# Patient Record
Sex: FEMALE | Race: WHITE
[De-identification: names, ages, dates, MRNs, and addresses within clinical notes are randomized per-mention and may not be internally consistent; named-entity substitution may affect disease eponyms.]

---

## 2020-01-20 LAB
ALBUMIN SERPL BCP-MCNC: 3.6 G/DL (ref 3.4–5)
ALBUMIN/GLOB SERPL: 1 {RATIO} (ref 1.1–1.5)
ALP SERPL-CCNC: 99 IU/L (ref 46–116)
ALT SERPL W P-5'-P-CCNC: 22 U/L (ref 30–65)
ANION GAP SERPL CALCULATED.3IONS-SCNC: 6 MMOL/L (ref 8–16)
AST SERPL W P-5'-P-CCNC: 13 U/L (ref 10–37)
BASOPHILS # BLD AUTO: 0.1 X10'3 (ref 0–0.2)
BASOPHILS NFR BLD AUTO: 0.7 % (ref 0–1)
BILIRUB SERPL-MCNC: 0.2 MG/DL (ref 0–1)
BUN SERPL-MCNC: 19 MG/DL (ref 7–18)
BUN/CREAT SERPL: 15.4 (ref 6.6–38)
CALCIUM SERPL-MCNC: 9.4 MG/DL (ref 8.5–10.1)
CHLORIDE SERPL-SCNC: 107 MMOL/L (ref 99–107)
CO2 SERPL-SCNC: 29.7 MMOL/L (ref 24–32)
CREAT SERPL-MCNC: 1.23 MG/DL (ref 0.4–0.9)
EOSINOPHIL # BLD AUTO: 0.2 X10'3 (ref 0–0.9)
EOSINOPHIL NFR BLD AUTO: 2.3 % (ref 0–6)
ERYTHROCYTE [DISTWIDTH] IN BLOOD BY AUTOMATED COUNT: 13.8 % (ref 11.5–14.5)
GFR SERPL CREATININE-BSD FRML MDRD: 43 ML/MIN
GLUCOSE SERPL-MCNC: 98 MG/DL (ref 70–104)
HCT VFR BLD AUTO: 43.9 % (ref 35–45)
HGB BLD-MCNC: 14.7 G/DL (ref 12–16)
LYMPHOCYTES # BLD AUTO: 2.2 X10'3 (ref 1.1–4.8)
LYMPHOCYTES NFR BLD AUTO: 22.8 % (ref 21–51)
MCH RBC QN AUTO: 28.3 PG (ref 27–31)
MCHC RBC AUTO-ENTMCNC: 33.5 G/DL (ref 33–36.5)
MCV RBC AUTO: 84.4 FL (ref 78–98)
MONOCYTES # BLD AUTO: 0.7 X10'3 (ref 0–0.9)
MONOCYTES NFR BLD AUTO: 7.3 % (ref 2–12)
NEUTROPHILS # BLD AUTO: 6.5 X10'3 (ref 1.8–7.7)
NEUTROPHILS NFR BLD AUTO: 66.9 % (ref 42–75)
PLATELET # BLD AUTO: 283 X10'3 (ref 140–440)
PMV BLD AUTO: 8.9 FL (ref 7.4–10.4)
POTASSIUM SERPL-SCNC: 4.2 MMOL/L (ref 3.4–5.1)
PROT SERPL-MCNC: 7.2 G/DL (ref 6.4–8.2)
RBC # BLD AUTO: 5.2 X10'6 (ref 4.2–5.6)
SODIUM SERPL-SCNC: 143 MMOL/L (ref 135–145)

## 2020-01-24 ENCOUNTER — HOSPITAL ENCOUNTER (OUTPATIENT)
Dept: HOSPITAL 94 - PAS | Age: 70
Discharge: HOME | End: 2020-01-24
Attending: ORTHOPAEDIC SURGERY
Payer: MEDICARE

## 2020-01-24 VITALS — BODY MASS INDEX: 37.36 KG/M2 | HEIGHT: 62 IN | WEIGHT: 203 LBS

## 2020-01-24 VITALS — DIASTOLIC BLOOD PRESSURE: 81 MMHG | SYSTOLIC BLOOD PRESSURE: 114 MMHG

## 2020-01-24 VITALS — SYSTOLIC BLOOD PRESSURE: 116 MMHG | DIASTOLIC BLOOD PRESSURE: 82 MMHG

## 2020-01-24 VITALS — DIASTOLIC BLOOD PRESSURE: 87 MMHG | SYSTOLIC BLOOD PRESSURE: 119 MMHG

## 2020-01-24 VITALS — DIASTOLIC BLOOD PRESSURE: 85 MMHG | SYSTOLIC BLOOD PRESSURE: 119 MMHG

## 2020-01-24 VITALS — SYSTOLIC BLOOD PRESSURE: 149 MMHG | DIASTOLIC BLOOD PRESSURE: 71 MMHG

## 2020-01-24 DIAGNOSIS — F12.90: ICD-10-CM

## 2020-01-24 DIAGNOSIS — Z98.890: ICD-10-CM

## 2020-01-24 DIAGNOSIS — Z79.899: ICD-10-CM

## 2020-01-24 DIAGNOSIS — Z87.891: ICD-10-CM

## 2020-01-24 DIAGNOSIS — Z98.41: ICD-10-CM

## 2020-01-24 DIAGNOSIS — M19.90: ICD-10-CM

## 2020-01-24 DIAGNOSIS — Z98.42: ICD-10-CM

## 2020-01-24 DIAGNOSIS — Z90.49: ICD-10-CM

## 2020-01-24 DIAGNOSIS — I12.9: ICD-10-CM

## 2020-01-24 DIAGNOSIS — E78.00: ICD-10-CM

## 2020-01-24 DIAGNOSIS — Z88.8: ICD-10-CM

## 2020-01-24 DIAGNOSIS — G56.01: Primary | ICD-10-CM

## 2020-01-24 DIAGNOSIS — Z90.710: ICD-10-CM

## 2020-01-24 DIAGNOSIS — M65.331: ICD-10-CM

## 2020-01-24 DIAGNOSIS — N18.9: ICD-10-CM

## 2020-01-24 PROCEDURE — 36415 COLL VENOUS BLD VENIPUNCTURE: CPT

## 2020-01-24 PROCEDURE — 82948 REAGENT STRIP/BLOOD GLUCOSE: CPT

## 2020-01-24 PROCEDURE — 85025 COMPLETE CBC W/AUTO DIFF WBC: CPT

## 2020-01-24 PROCEDURE — 93005 ELECTROCARDIOGRAM TRACING: CPT

## 2020-01-24 PROCEDURE — 26055 INCISE FINGER TENDON SHEATH: CPT

## 2020-01-24 PROCEDURE — 64721 CARPAL TUNNEL SURGERY: CPT

## 2020-01-24 PROCEDURE — 80053 COMPREHEN METABOLIC PANEL: CPT

## 2020-01-24 NOTE — NUR
ALL DC CRITERIA HAS BEEN MET. IV TAKEN OUT WITHOUT COMPLICATIONS. ALL INSTRUCTIONS COVERED 
AND ALL QUESTIONS ANSWERED. DRESSINGS CDI. OUT VIA WHEELCHAIR TO PERSONAL VEHICLE WHERE 
PATIENT WAS SECURED IN AND DRIVEN HOME BY FAMILY. 




-------------------------------------------------------------------------------

Addendum: 01/24/20 at 1216 by Hung Fitzpatrick RN, RN

-------------------------------------------------------------------------------

Amended: Links added.

## 2020-01-24 NOTE — NUR
Received from OR via [facility] , accompanied by Anesthesiologist KARMEN and report given by 
Anesthesiolgist. PATIENTT WITH 20G PIV IN LEFT UE RUNNING LR . DENIES PAIN. RIGHT 
WRIST DRESSING IS CDI. MORALES + CAP REFILL. ELEVATED UPON ARRIVAL.


-------------------------------------------------------------------------------

Addendum: 01/24/20 at 1140 by Hung Fitzpatrick RN, RN

-------------------------------------------------------------------------------

Amended: Links added.